# Patient Record
Sex: MALE | RURAL
[De-identification: names, ages, dates, MRNs, and addresses within clinical notes are randomized per-mention and may not be internally consistent; named-entity substitution may affect disease eponyms.]

---
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
## 2019-12-16 ENCOUNTER — APPOINTMENT (OUTPATIENT)
Age: 19
Setting detail: DERMATOLOGY
End: 2019-12-16

## 2019-12-16 DIAGNOSIS — Z71.89 OTHER SPECIFIED COUNSELING: ICD-10-CM

## 2019-12-16 DIAGNOSIS — H01.13 ECZEMATOUS DERMATITIS OF EYELID: ICD-10-CM

## 2019-12-16 PROBLEM — L85.3 XEROSIS CUTIS: Status: ACTIVE | Noted: 2019-12-16

## 2019-12-16 PROBLEM — J45.909 UNSPECIFIED ASTHMA, UNCOMPLICATED: Status: ACTIVE | Noted: 2019-12-16

## 2019-12-16 PROBLEM — H01.139 ECZEMATOUS DERMATITIS OF UNSPECIFIED EYE, UNSPECIFIED EYELID: Status: ACTIVE | Noted: 2019-12-16

## 2019-12-16 PROCEDURE — OTHER MIPS QUALITY: OTHER

## 2019-12-16 PROCEDURE — 99213 OFFICE O/P EST LOW 20 MIN: CPT

## 2019-12-16 PROCEDURE — OTHER TREATMENT REGIMEN: OTHER

## 2019-12-16 PROCEDURE — OTHER PRESCRIPTION: OTHER

## 2019-12-16 PROCEDURE — OTHER COUNSELING: OTHER

## 2019-12-16 RX ORDER — DESONIDE 0.5 MG/G
1 LOTION TOPICAL BID
Qty: 1 | Refills: 0 | Status: ERX | COMMUNITY
Start: 2019-12-16

## 2019-12-16 ASSESSMENT — LOCATION SIMPLE DESCRIPTION DERM
LOCATION SIMPLE: LEFT EYEBROW
LOCATION SIMPLE: RIGHT EYEBROW

## 2019-12-16 ASSESSMENT — LOCATION DETAILED DESCRIPTION DERM
LOCATION DETAILED: RIGHT CENTRAL EYEBROW
LOCATION DETAILED: LEFT CENTRAL EYEBROW

## 2019-12-16 ASSESSMENT — LOCATION ZONE DERM: LOCATION ZONE: FACE
